# Patient Record
Sex: MALE | HISPANIC OR LATINO | URBAN - METROPOLITAN AREA
[De-identification: names, ages, dates, MRNs, and addresses within clinical notes are randomized per-mention and may not be internally consistent; named-entity substitution may affect disease eponyms.]

---

## 2017-06-29 ENCOUNTER — APPOINTMENT (RX ONLY)
Dept: URBAN - METROPOLITAN AREA OTHER 12 | Facility: OTHER | Age: 37
Setting detail: DERMATOLOGY
End: 2017-06-29

## 2017-06-29 DIAGNOSIS — L90.5 SCAR CONDITIONS AND FIBROSIS OF SKIN: ICD-10-CM

## 2017-06-29 DIAGNOSIS — D485 NEOPLASM OF UNCERTAIN BEHAVIOR OF SKIN: ICD-10-CM

## 2017-06-29 DIAGNOSIS — L91.8 OTHER HYPERTROPHIC DISORDERS OF THE SKIN: ICD-10-CM

## 2017-06-29 PROBLEM — D40.8 NEOPLASM OF UNCERTAIN BEHAVIOR OF OTHER SPECIFIED MALE GENITAL ORGANS: Status: ACTIVE | Noted: 2017-06-29

## 2017-06-29 PROBLEM — D48.5 NEOPLASM OF UNCERTAIN BEHAVIOR OF SKIN: Status: ACTIVE | Noted: 2017-06-29

## 2017-06-29 PROCEDURE — ? LIQUID NITROGEN

## 2017-06-29 PROCEDURE — 17000 DESTRUCT PREMALG LESION: CPT

## 2017-06-29 PROCEDURE — 11100: CPT | Mod: 59

## 2017-06-29 PROCEDURE — 11101: CPT

## 2017-06-29 PROCEDURE — ? BIOPSY BY SHAVE METHOD

## 2017-06-29 PROCEDURE — 99242 OFF/OP CONSLTJ NEW/EST SF 20: CPT | Mod: 25

## 2017-06-29 ASSESSMENT — LOCATION DETAILED DESCRIPTION DERM
LOCATION DETAILED: RIGHT ANTERIOR SCROTUM
LOCATION DETAILED: LEFT SUPERIOR LATERAL NECK
LOCATION DETAILED: RIGHT SUPERIOR MEDIAL UPPER BACK
LOCATION DETAILED: RIGHT ANTERIOR PROXIMAL THIGH

## 2017-06-29 ASSESSMENT — LOCATION SIMPLE DESCRIPTION DERM
LOCATION SIMPLE: SCROTUM
LOCATION SIMPLE: RIGHT THIGH
LOCATION SIMPLE: NECK
LOCATION SIMPLE: RIGHT UPPER BACK

## 2017-06-29 ASSESSMENT — LOCATION ZONE DERM
LOCATION ZONE: NECK
LOCATION ZONE: TRUNK
LOCATION ZONE: LEG
LOCATION ZONE: GENITALIA

## 2017-06-29 NOTE — PROCEDURE: BIOPSY BY SHAVE METHOD
Biopsy Method: Personna blade
Anesthesia Type: 1% lidocaine with epinephrine and a 1:10 solution of 8.4% sodium bicarbonate
Billing Type: Third-Party Bill
Hemostasis: Drysol
Dressing: bandage
Destruction After The Procedure: No
Electrodesiccation And Curettage Text: The wound bed was treated with electrodesiccation and curettage after the biopsy was performed.
Biopsy Type: H and E
Cryotherapy Text: The wound bed was treated with cryotherapy after the biopsy was performed.
Detail Level: Detailed
Lab: [id number]
Post-Care Instructions: I reviewed with the patient in detail post-care instructions. Patient is to keep the biopsy site dry overnight, and then apply Vaseline and band aid until healed.
Electrodesiccation Text: The wound bed was treated with electrodesiccation after the biopsy was performed.
Notification Instructions: Patient will be notified of biopsy results. However, patient instructed to call the office if not contacted within 2 weeks.
X Size Of Lesion In Cm: 0
Path Notes (To The Dermatopathologist): Please type if positive HPV
Type Of Destruction Used: Curettage
Silver Nitrate Text: The wound bed was treated with silver nitrate after the biopsy was performed.
Curettage Text: The wound bed was treated with curettage after the biopsy was performed.
Lab Facility: [id number]
Anesthesia Volume In Cc: 1.4
Wound Care: Vaseline
Consent: Written consent was obtained and risks were reviewed including but not limited to scarring, infection, bleeding, scabbing, incomplete removal, nerve damage and allergy to anesthesia.

## 2017-07-11 ENCOUNTER — RX ONLY (OUTPATIENT)
Age: 37
Setting detail: RX ONLY
End: 2017-07-11

## 2017-07-11 RX ORDER — IMIQUIMOD 50 MG/G
CREAM TOPICAL
Qty: 1 | Refills: 2 | Status: ERX | COMMUNITY
Start: 2017-07-11

## 2017-07-11 RX ORDER — IMIQUIMOD 50 MG/G
CREAM TOPICAL
Qty: 1 | Refills: 0 | Status: CANCELLED
Stop reason: CLARIF